# Patient Record
Sex: FEMALE | Race: BLACK OR AFRICAN AMERICAN | NOT HISPANIC OR LATINO
[De-identification: names, ages, dates, MRNs, and addresses within clinical notes are randomized per-mention and may not be internally consistent; named-entity substitution may affect disease eponyms.]

---

## 2020-10-12 ENCOUNTER — APPOINTMENT (OUTPATIENT)
Dept: CARDIOLOGY | Facility: CLINIC | Age: 65
End: 2020-10-12

## 2021-03-08 ENCOUNTER — RESULT REVIEW (OUTPATIENT)
Age: 66
End: 2021-03-08

## 2022-04-18 ENCOUNTER — APPOINTMENT (OUTPATIENT)
Dept: PULMONOLOGY | Facility: CLINIC | Age: 67
End: 2022-04-18
Payer: MEDICARE

## 2022-04-18 VITALS
WEIGHT: 180 LBS | SYSTOLIC BLOOD PRESSURE: 110 MMHG | OXYGEN SATURATION: 100 % | TEMPERATURE: 97.3 F | DIASTOLIC BLOOD PRESSURE: 66 MMHG | HEART RATE: 86 BPM | HEIGHT: 64 IN | BODY MASS INDEX: 30.73 KG/M2

## 2022-04-18 DIAGNOSIS — M80.80XD OTHER OSTEOPOROSIS WITH CURRENT PATHOLOGICAL FRACTURE, UNSPECIFIED SITE, SUBSEQUENT ENCOUNTER FOR FRACTURE WITH ROUTINE HEALING: ICD-10-CM

## 2022-04-18 DIAGNOSIS — M81.0 AGE-RELATED OSTEOPOROSIS W/OUT CURRENT PATHOLOGICAL FRACTURE: ICD-10-CM

## 2022-04-18 DIAGNOSIS — R91.8 OTHER NONSPECIFIC ABNORMAL FINDING OF LUNG FIELD: ICD-10-CM

## 2022-04-18 DIAGNOSIS — M19.90 UNSPECIFIED OSTEOARTHRITIS, UNSPECIFIED SITE: ICD-10-CM

## 2022-04-18 PROCEDURE — 99204 OFFICE O/P NEW MOD 45 MIN: CPT

## 2022-04-18 RX ORDER — ALENDRONATE SODIUM 70 MG/1
70 TABLET ORAL
Qty: 16 | Refills: 1 | Status: ACTIVE | COMMUNITY
Start: 2022-04-18

## 2022-04-18 RX ORDER — EMTRICITABINE AND TENOFOVIR ALAFENAMIDE 200; 25 MG/1; MG/1
200-25 TABLET ORAL DAILY
Qty: 1 | Refills: 0 | Status: ACTIVE | COMMUNITY
Start: 2022-04-18

## 2022-04-18 RX ORDER — FAMOTIDINE 20 MG/1
20 TABLET, FILM COATED ORAL DAILY
Qty: 90 | Refills: 1 | Status: ACTIVE | COMMUNITY
Start: 2022-04-18

## 2022-04-18 RX ORDER — RALTEGRAVIR 400 MG/1
400 TABLET, FILM COATED ORAL TWICE DAILY
Refills: 0 | Status: ACTIVE | COMMUNITY
Start: 2022-04-18

## 2022-04-18 RX ORDER — GABAPENTIN 300 MG/1
300 CAPSULE ORAL
Refills: 0 | Status: ACTIVE | COMMUNITY
Start: 2022-03-09

## 2022-04-18 RX ORDER — IBUPROFEN 600 MG/1
600 TABLET, FILM COATED ORAL
Refills: 0 | Status: ACTIVE | COMMUNITY
Start: 2022-04-18

## 2022-04-18 RX ORDER — ALBUTEROL SULFATE 90 UG/1
108 (90 BASE) INHALANT RESPIRATORY (INHALATION) 4 TIMES DAILY
Qty: 1 | Refills: 0 | Status: ACTIVE | COMMUNITY
Start: 2022-04-18

## 2022-04-18 NOTE — HISTORY OF PRESENT ILLNESS
[TextBox_4] : 66 former smoker (15 py, quit 30 ya) with HIV on ART (reports undetectable viral load), remote history of endocarditis s/p tricuspid valve repair, severe TR, mild CAD, HTN referred for abnormal chest CT ([facility]) from 4/7/22 which showed an 8 mm RUL nodule and mild peripheral ground glass. Pt denies dyspnea at rest or on exertion, no cough, sputum, fever, chest pain, orthopnea. She has good appetite and no unintentional weight loss. Pt has never seen a pulmonologist and says she does not use any inhalers though she has a prescription for albuterol, which her PMD reportedly prescribed at one point of shortness of breath and concern for COPD given her smoking history. PFT today is normal. Denies COVID in the past, but said she had pneumonia in the 1980s, denies history of TB or PCP.

## 2022-04-18 NOTE — PHYSICAL EXAM
[No Acute Distress] : no acute distress [Well Nourished] : well nourished [Normal Oropharynx] : normal oropharynx [Normal Appearance] : normal appearance [No JVD] : no jvd [Normal Rate/Rhythm] : normal rate/rhythm [Normal S1, S2] : normal s1, s2 [No Murmurs] : no murmurs [No Acc Muscle Use] : no acc muscle use [Normal Rhythm and Effort] : normal rhythm and effort [Clear to Auscultation Bilaterally] : clear to auscultation bilaterally [No Abnormalities] : no abnormalities [Benign] : benign [Not Tender] : not tender [Soft] : soft [Normal Gait] : normal gait [No Clubbing] : no clubbing [No Cyanosis] : no cyanosis [No Edema] : no edema [Normal Color/ Pigmentation] : normal color/ pigmentation [No Focal Deficits] : no focal deficits [Oriented x3] : oriented x3 [Normal Affect] : normal affect [Normal Mood] : normal mood

## 2022-04-18 NOTE — ASSESSMENT
[FreeTextEntry1] : 66 former smoker (15 py, quit 30 ya) with HIV on ART (reports undetectable viral load), remote history of endocarditis s/p tricuspid valve repair, severe TR, mild CAD, HTN referred for abnormal chest CT ([facility]) from 4/7/22 which showed an 8 mm RUL nodule and mild peripheral ground glass. She is currently asymptomatic, no hypoxemia on exertion. We reviewed the images from pt's previous scan at [facility] and instructed pt to schedule a repeat CT chest in 6 months at [facility]. In the meantime, we told her to obtain the disk from her previous scan at [facility] and drop it off at our office so that it can be used by the radiologist for comparison. \par \par Al Garcia MD\par Pulmonary Fellow\par \par Discussed with Dr. Rosario\par \par

## 2022-04-28 ENCOUNTER — NON-APPOINTMENT (OUTPATIENT)
Age: 67
End: 2022-04-28

## 2022-04-28 ENCOUNTER — APPOINTMENT (OUTPATIENT)
Dept: CARDIOLOGY | Facility: CLINIC | Age: 67
End: 2022-04-28
Payer: MEDICARE

## 2022-04-28 VITALS
BODY MASS INDEX: 31.41 KG/M2 | TEMPERATURE: 97.1 F | WEIGHT: 184 LBS | OXYGEN SATURATION: 98 % | HEART RATE: 71 BPM | HEIGHT: 64 IN

## 2022-04-28 VITALS — SYSTOLIC BLOOD PRESSURE: 124 MMHG | DIASTOLIC BLOOD PRESSURE: 80 MMHG

## 2022-04-28 VITALS — DIASTOLIC BLOOD PRESSURE: 86 MMHG | SYSTOLIC BLOOD PRESSURE: 140 MMHG

## 2022-04-28 DIAGNOSIS — Z83.3 FAMILY HISTORY OF DIABETES MELLITUS: ICD-10-CM

## 2022-04-28 DIAGNOSIS — R06.02 SHORTNESS OF BREATH: ICD-10-CM

## 2022-04-28 DIAGNOSIS — Z87.891 PERSONAL HISTORY OF NICOTINE DEPENDENCE: ICD-10-CM

## 2022-04-28 DIAGNOSIS — Z82.49 FAMILY HISTORY OF ISCHEMIC HEART DISEASE AND OTHER DISEASES OF THE CIRCULATORY SYSTEM: ICD-10-CM

## 2022-04-28 DIAGNOSIS — G62.9 POLYNEUROPATHY, UNSPECIFIED: ICD-10-CM

## 2022-04-28 DIAGNOSIS — Z87.898 PERSONAL HISTORY OF OTHER SPECIFIED CONDITIONS: ICD-10-CM

## 2022-04-28 PROCEDURE — 99205 OFFICE O/P NEW HI 60 MIN: CPT

## 2022-04-28 PROCEDURE — 93000 ELECTROCARDIOGRAM COMPLETE: CPT

## 2022-04-29 NOTE — PHYSICAL EXAM
[No Acute Distress] : no acute distress [Obese] : obese [Normal Conjunctiva] : normal conjunctiva [Normal Venous Pressure] : normal venous pressure [No Carotid Bruit] : no carotid bruit 1 [Normal S1, S2] : normal S1, S2 [No Rub] : no rub [No Gallop] : no gallop [Murmur] : murmur [Clear Lung Fields] : clear lung fields [Good Air Entry] : good air entry [No Respiratory Distress] : no respiratory distress  [Soft] : abdomen soft [Non Tender] : non-tender [No Masses/organomegaly] : no masses/organomegaly [Normal Bowel Sounds] : normal bowel sounds [Normal Gait] : normal gait [No Edema] : no edema [No Cyanosis] : no cyanosis [No Clubbing] : no clubbing [No Varicosities] : no varicosities [No Rash] : no rash [No Skin Lesions] : no skin lesions [Moves all extremities] : moves all extremities [No Focal Deficits] : no focal deficits [Normal Speech] : normal speech [Alert and Oriented] : alert and oriented [Normal memory] : normal memory [de-identified] : 1/6 YEN aortic area.

## 2022-04-29 NOTE — CARDIOLOGY SUMMARY
[de-identified] : Sinus rhythm with frequent APCs.  Some were aberrantly conducted.  There is also left axis deviation. ?blocked APC

## 2022-04-29 NOTE — DISCUSSION/SUMMARY
[FreeTextEntry1] : I recommended that she go back on hydrochlorothiazide and to follow low-salt diet for consistent control of her blood pressure.  I also strongly advised her to try and lose weight which would help in many ways.  I gave her the telephone number for /valve clinic, to set up an appointment for further follow-up.  In the meantime I asked her to see if she can get me the report of the Holter monitor.

## 2022-04-29 NOTE — HISTORY OF PRESENT ILLNESS
[FreeTextEntry1] : 66-year-old lady was referred by Dr. Lulú Jonas.  She had first seen Dr. Salcedo because of irregular heartbeat about 2 months ago. Dr. Jonas wanted her to see DR Davis from the structural heart team from  [facility] because of tricuspid regurgitation.  Her PCP is Dr. John Gilbert from [facility].  She was not sure as to why the appointment was made for her to see me.  She felt that it was Dr. Jonas's office who referred her to see me.\par \par Apparently, a 24-hour Holter monitor was performed.  She does not know the results.  Echocardiogram was also done as part of evaluation.  Left ventricular size was normal.  Apparently there was mild concentric left ventricular hypertrophy with normal global wall motion.  Calculated EF was 74%.  Right atrial cavity was mild to moderately dilated.  Mitral valve ws structurally normal with mild mitral regurgitation.  Tricuspid valve showed severe eccentric regurgitation.  There was also moderate pulmonary hypertension and a question of leaflet prolapse from lack of coaptation as suspected mechanism.  IVC was normal.  A CT scan of the chest was also done.  It showed bilateral airspace opacities which were multifocal and located peripherally including areas of ground glass appearance..  She does not have any history of COVID-19 infection.  There was also bilateral apical scarring.  Multiple pulmonary nodules were also seen.  A repeat chest CT was recommended to be done in 6 months.  She was also noted to have calcified gallstones, contracted left kidney and a nodular appearance to the liver.  \par \par In 1976, owing to IV drug abuse, she had endocarditis involving 2 valves.  He was treated with antibiotics.  She stopped using drugs about 30 years ago.\par \par She denies any exertional chest pain palpitation or dizziness.  She stated that she does not walk much.  After walking about a block or so she has to stop and take "a deep breath" to feel good.  About 2 months ago she had swelling of her legs.  Her blood pressure medications were changed and she was given a diuretic.  However since she does not have swelling now, she stopped the diuretic.  She has not taken it for a month.  She feels skipping of heartbeat.  At times she thinks there may be some racing.\par \par In 2020, because of shortness of breath she had a stress test which was felt to be abnormal and subsequently she had cardiac catheterization done.  Left main had minimal luminal irregularities.  LAD had moderate diffuse disease throughout the vessel.  Mid LAD also had a 25% lesion.  Circumflex showed mild diffuse disease throughout the vessel.  Similar findings were noted throughout the right coronary artery.  Left ventricular ejection fraction was estimated to be 55%.  Medical treatment was recommended.\par \par She had PFTs done by . They were normal.

## 2022-04-29 NOTE — ASSESSMENT
[FreeTextEntry1] : Her initial blood pressure was high.  Subsequent blood pressure reading was lower and in  the normal range.  She does not follow a low-salt diet.  I do not have the results of the Holter monitor.\par \par She seems to have fairly significant tricuspid regurgitation.  It would be a good idea for her to see Dr. Davis from the structural heart team for further evaluation. NELLY would be helpful.

## 2022-05-23 ENCOUNTER — APPOINTMENT (OUTPATIENT)
Dept: CARDIOTHORACIC SURGERY | Facility: CLINIC | Age: 67
End: 2022-05-23
Payer: MEDICARE

## 2022-05-23 VITALS
HEIGHT: 64 IN | BODY MASS INDEX: 31.58 KG/M2 | SYSTOLIC BLOOD PRESSURE: 102 MMHG | HEART RATE: 72 BPM | WEIGHT: 185 LBS | TEMPERATURE: 97.6 F | DIASTOLIC BLOOD PRESSURE: 71 MMHG | OXYGEN SATURATION: 100 % | RESPIRATION RATE: 18 BRPM

## 2022-05-23 PROCEDURE — 99204 OFFICE O/P NEW MOD 45 MIN: CPT

## 2022-05-23 RX ORDER — LYSINE HCL 500 MG
TABLET ORAL TWICE DAILY
Refills: 0 | Status: ACTIVE | COMMUNITY

## 2022-05-23 RX ORDER — ASPIRIN 81 MG/1
81 TABLET, CHEWABLE ORAL
Refills: 0 | Status: DISCONTINUED | COMMUNITY
Start: 2022-04-18 | End: 2022-05-23

## 2022-06-03 NOTE — REVIEW OF SYSTEMS
[Fever] : no fever [Chills] : no chills [Chest Discomfort] : no chest discomfort [Palpitations] : no palpitations [Orthopnea] : no orthopnea [PND] : no PND [Abdominal Pain] : no abdominal pain [FreeTextEntry4] : no active dental concerns [FreeTextEntry7] : appetite decreased  [de-identified] : neuropathy

## 2022-06-03 NOTE — PHYSICAL EXAM
[Right Carotid Bruit] : no bruit heard over the right carotid [Left Carotid Bruit] : no bruit heard over the left carotid

## 2022-06-03 NOTE — DISCUSSION/SUMMARY
[FreeTextEntry1] : Ms Cox is referred for evaluation of TR; it is of unclear etiology, and may be a sequela of a remote IE. She will need a repeat TTE for further assessment and possibly a NELLY. I discussed the upcoming TRISCEND II study (TTVR), and she would like to be considered. She will return, with a TTE, once we are able to enroll patients. No changes made to her medications as she appears compensated today in the office. I advised that she continue the Lasix once daily as she has been taking it (not TIW, as was prescribed).

## 2022-06-03 NOTE — HISTORY OF PRESENT ILLNESS
[FreeTextEntry1] : Ms. Cox is a 66 year old female referred by Carlos Alberto Jonas and Jeremias for evaluation of tricuspid regurgitation. She was seen by Dr. Jonas for cardiac clearance prior to planned right shoulder repair. Her past medical history includes endocarditis in 1976 in the setting of IVD. A TTE with Dr. Jonas demonstrated severe tricuspid regurgitation with evidence of a prior tricuspid valve repair. She reports several weeks of increasing exertional dyspnea and fatigue with functional decline. Her appetite is diminished. She has no PND, orthopnea, dizziness or edema.  She had significant ankle and pedal edema that improved after starting HCTZ six months ago. \par \par Past medical history includes HIV (diagnosed in 1989) on HAART, IVDA (quit 30 years ago and attends Narcotic Anonymous regularly), HTN, arthritis, Hepatitis C (treated 10 years ago), and neuropathy. She had an abnormal ST in 2020 and angiography at [facility] demonstrated mild, nonobstructive disease. \par \par She has a remote history of IE but reports no prior cardiac surgery; she was treated with an extended course of antibiotics at that time (it was during her pregnancy in 1976). She recently lost her 45 year old daughter to COVID (she had underlying sarcoid). She is now involved in the care of her 3 grandchildren. She has a sensation of needing "to take a real deep breath to keep the right breathing flow" over the prior 3 months--not specifically with activity. She admits to being quite sedentary and only really walks from her apartment to her car and a short FOS in her apartment. She does feel "a lot of fatigue lately" and often asks herself "why am I so weak lately". She also feels "hot and cold" in her lower extremities. She notes "every now and then I have to take a deep breath" at times. She has a bottle of Lasix that was prescribed 20mg on Monday, Wednesday, and Friday--but notes she did not realize that was the case and has been taking it daily for the past 2 months. It was started at that time for LE edema that resolved. Her appetite is fair, she eats "like once a day".

## 2022-07-11 ENCOUNTER — APPOINTMENT (OUTPATIENT)
Dept: CARDIOTHORACIC SURGERY | Facility: CLINIC | Age: 67
End: 2022-07-11

## 2022-08-16 ENCOUNTER — NON-APPOINTMENT (OUTPATIENT)
Age: 67
End: 2022-08-16

## 2022-08-16 ENCOUNTER — APPOINTMENT (OUTPATIENT)
Dept: CARDIOLOGY | Facility: CLINIC | Age: 67
End: 2022-08-16
Payer: MEDICARE

## 2022-08-16 VITALS
BODY MASS INDEX: 31.24 KG/M2 | WEIGHT: 183 LBS | HEIGHT: 64 IN | DIASTOLIC BLOOD PRESSURE: 52 MMHG | TEMPERATURE: 97.2 F | OXYGEN SATURATION: 96 % | SYSTOLIC BLOOD PRESSURE: 92 MMHG | HEART RATE: 94 BPM

## 2022-08-16 DIAGNOSIS — I07.1 RHEUMATIC TRICUSPID INSUFFICIENCY: ICD-10-CM

## 2022-08-16 PROCEDURE — 99215 OFFICE O/P EST HI 40 MIN: CPT | Mod: 25

## 2022-08-16 PROCEDURE — 93000 ELECTROCARDIOGRAM COMPLETE: CPT

## 2022-08-16 RX ORDER — LOSARTAN POTASSIUM 100 MG/1
100 TABLET, FILM COATED ORAL
Refills: 0 | Status: DISCONTINUED | COMMUNITY
Start: 2022-04-18 | End: 2022-08-16

## 2022-08-16 RX ORDER — HYDROCHLOROTHIAZIDE 12.5 MG/1
12.5 TABLET ORAL DAILY
Qty: 90 | Refills: 1 | Status: DISCONTINUED | COMMUNITY
Start: 2022-04-28 | End: 2022-08-16

## 2022-08-16 NOTE — HISTORY OF PRESENT ILLNESS
[FreeTextEntry1] : HTN HIV [de-identified] : MARINA DIXON 66 year F presents with no dyspnea, (NYHA 0 ); no chest pain (CCS 0 ); \par No palpitations; No syncope/presyncope; no claudication No peripheral edema.\par No    Fam Hx CAD           remote Tobacco         NO  DM         No  HLD          Yes HTN\par 03/22       Echo   CLVHi    LEE              LVEF 74\par 04/22      EKG        NSR    PAC       82 BPM               NO delta   NO pathologic Q's    Normal QTc\par 11/2020  Cath  Non obstructive    ([facility])   LVEF 55%              No PCI                No CABG\par Remote IE TV 1976 now TR treated with losartan lasix HCTZ but borderline low SBP tired and exertional intolerance. Repeat echo. Reduce losartan to 25mg/d and stop HCTZ.. Continue lasix 20mg. RTC 6w.\par BMI 32 HIV positive Remote IVDA

## 2022-08-25 ENCOUNTER — APPOINTMENT (OUTPATIENT)
Dept: CARDIOLOGY | Facility: CLINIC | Age: 67
End: 2022-08-25

## 2022-08-29 ENCOUNTER — APPOINTMENT (OUTPATIENT)
Dept: CARDIOLOGY | Facility: CLINIC | Age: 67
End: 2022-08-29
Payer: MEDICARE

## 2022-08-29 PROCEDURE — 93306 TTE W/DOPPLER COMPLETE: CPT

## 2022-10-17 ENCOUNTER — APPOINTMENT (OUTPATIENT)
Dept: PULMONOLOGY | Facility: CLINIC | Age: 67
End: 2022-10-17

## 2022-10-20 ENCOUNTER — APPOINTMENT (OUTPATIENT)
Dept: CARDIOLOGY | Facility: CLINIC | Age: 67
End: 2022-10-20
Payer: MEDICARE

## 2022-10-20 VITALS
WEIGHT: 180 LBS | DIASTOLIC BLOOD PRESSURE: 75 MMHG | SYSTOLIC BLOOD PRESSURE: 117 MMHG | HEIGHT: 64 IN | BODY MASS INDEX: 30.73 KG/M2 | OXYGEN SATURATION: 97 % | TEMPERATURE: 98 F | HEART RATE: 74 BPM

## 2022-10-20 DIAGNOSIS — I49.1 ATRIAL PREMATURE DEPOLARIZATION: ICD-10-CM

## 2022-10-20 PROCEDURE — 99215 OFFICE O/P EST HI 40 MIN: CPT | Mod: 25

## 2022-10-20 PROCEDURE — 93000 ELECTROCARDIOGRAM COMPLETE: CPT

## 2022-10-20 NOTE — REASON FOR VISIT
[FreeTextEntry1] : Total visit time 45min.\par MARINA DIXON 67 year F presents with HTN HLD and reports no dyspnea, chest pain, palpitations, syncope, claudication mild peripheral edema.   Tobacco Negative/Reformed.BMI 31. 8/22 EKG sinus tachy 103/min today HR 74/min. Echo 8/22 severe TR (phx infective endocarditis) 11/20 Cath nonobstructive CAD (LAD 25%). No new interval complaints. BP improved and andrew new RVF symptoms. COntinue lasix losartan RTC 6m\par No Fam Hx CAD; remote Tobacco; NO  DM; No  HLD; Yes HTN\par 03/22       Echo   CLVHi    LEE    TR severe LVEF 74\par 04/22      EKG        NSR    PAC       82 BPM               NO delta   NO pathologic Q's    Normal QTc\par 11/2020  Cath  Non obstructive    ([facility])   LVEF 55%              No PCI                No CABG\par Remote IE TV 1976 now TR treated with losartan lasix. BMI 32 HIV positive Remote IVDA\par \par

## 2023-02-13 ENCOUNTER — APPOINTMENT (OUTPATIENT)
Dept: CARDIOLOGY | Facility: CLINIC | Age: 68
End: 2023-02-13
Payer: MEDICARE

## 2023-02-13 ENCOUNTER — NON-APPOINTMENT (OUTPATIENT)
Age: 68
End: 2023-02-13

## 2023-02-13 VITALS
BODY MASS INDEX: 30.22 KG/M2 | TEMPERATURE: 98 F | SYSTOLIC BLOOD PRESSURE: 137 MMHG | DIASTOLIC BLOOD PRESSURE: 84 MMHG | OXYGEN SATURATION: 98 % | HEART RATE: 68 BPM | HEIGHT: 64 IN | WEIGHT: 177 LBS

## 2023-02-13 DIAGNOSIS — Z86.19 PERSONAL HISTORY OF OTHER INFECTIOUS AND PARASITIC DISEASES: ICD-10-CM

## 2023-02-13 DIAGNOSIS — Z86.79 PERSONAL HISTORY OF OTHER DISEASES OF THE CIRCULATORY SYSTEM: ICD-10-CM

## 2023-02-13 PROCEDURE — 99215 OFFICE O/P EST HI 40 MIN: CPT | Mod: 25

## 2023-02-13 PROCEDURE — 93000 ELECTROCARDIOGRAM COMPLETE: CPT

## 2023-02-13 NOTE — PHYSICAL EXAM
[Normal Conjunctiva] : normal conjunctiva [Normal Venous Pressure] : normal venous pressure [No Carotid Bruit] : no carotid bruit [No Rub] : no rub [No Gallop] : no gallop [Clear Lung Fields] : clear lung fields [Good Air Entry] : good air entry [No Masses/organomegaly] : no masses/organomegaly [No Cyanosis] : no cyanosis [No Clubbing] : no clubbing [No Skin Lesions] : no skin lesions [Moves all extremities] : moves all extremities [Normal Speech] : normal speech [Alert and Oriented] : alert and oriented [Normal memory] : normal memory [No Acute Distress] : no acute distress [Well Nourished] : well nourished [Well Developed] : well developed [Well-Appearing] : well-appearing [Normal Sclera/Conjunctiva] : normal sclera/conjunctiva [PERRL] : pupils equal round and reactive to light [EOMI] : extraocular movements intact [Normal Outer Ear/Nose] : the outer ears and nose were normal in appearance [Normal Oropharynx] : the oropharynx was normal [No JVD] : no jugular venous distention [No Lymphadenopathy] : no lymphadenopathy [Supple] : supple [Thyroid Normal, No Nodules] : the thyroid was normal and there were no nodules present [No Respiratory Distress] : no respiratory distress  [No Accessory Muscle Use] : no accessory muscle use [Clear to Auscultation] : lungs were clear to auscultation bilaterally [Normal Rate] : normal rate  [Regular Rhythm] : with a regular rhythm [Normal S1, S2] : normal S1 and S2 [No Murmur] : no murmur heard [No Carotid Bruits] : no carotid bruits [No Abdominal Bruit] : a ~M bruit was not heard ~T in the abdomen [No Varicosities] : no varicosities [Pedal Pulses Present] : the pedal pulses are present [No Edema] : there was no peripheral edema [No Palpable Aorta] : no palpable aorta [No Extremity Clubbing/Cyanosis] : no extremity clubbing/cyanosis [Soft] : abdomen soft [Non Tender] : non-tender [Non-distended] : non-distended [No Masses] : no abdominal mass palpated [No HSM] : no HSM [Normal Bowel Sounds] : normal bowel sounds [Normal Posterior Cervical Nodes] : no posterior cervical lymphadenopathy [Normal Anterior Cervical Nodes] : no anterior cervical lymphadenopathy [No CVA Tenderness] : no CVA  tenderness [No Spinal Tenderness] : no spinal tenderness [No Joint Swelling] : no joint swelling [Grossly Normal Strength/Tone] : grossly normal strength/tone [No Rash] : no rash [Coordination Grossly Intact] : coordination grossly intact [No Focal Deficits] : no focal deficits [Normal Gait] : normal gait [Deep Tendon Reflexes (DTR)] : deep tendon reflexes were 2+ and symmetric [Normal Affect] : the affect was normal [Normal Insight/Judgement] : insight and judgment were intact

## 2023-02-13 NOTE — HISTORY OF PRESENT ILLNESS
[FreeTextEntry1] : HTN HIV [de-identified] : MARINA DIXON 66 year F presents with No interval complaints. Tolerating medications well. Total visit time 45min.\par 02/23      EKG:NSR  77 BPM; AMI AU PPRWP; 11/2020  Cath  SARAH    ([facility])   LVEF 55%  \par Remote IE TV 1976 now TR treated with losartan lasix. Losartan  25mg/d.. Continue lasix 20mg. RTC 6w.\par BMI 32 HIV positive Remote IVDA. 8/22 Echo EF 55%. C/o leg coolness ANAHI requested. RTC 3m. SBP improved.

## 2023-03-30 ENCOUNTER — APPOINTMENT (OUTPATIENT)
Dept: ULTRASOUND IMAGING | Facility: HOSPITAL | Age: 68
End: 2023-03-30

## 2023-04-20 ENCOUNTER — APPOINTMENT (OUTPATIENT)
Dept: CARDIOLOGY | Facility: CLINIC | Age: 68
End: 2023-04-20
Payer: MEDICARE

## 2023-04-20 VITALS
DIASTOLIC BLOOD PRESSURE: 85 MMHG | WEIGHT: 180 LBS | OXYGEN SATURATION: 97 % | SYSTOLIC BLOOD PRESSURE: 134 MMHG | TEMPERATURE: 97.2 F | HEIGHT: 64 IN | BODY MASS INDEX: 30.73 KG/M2 | HEART RATE: 65 BPM

## 2023-04-20 DIAGNOSIS — I10 ESSENTIAL (PRIMARY) HYPERTENSION: ICD-10-CM

## 2023-04-20 DIAGNOSIS — F19.11 OTHER PSYCHOACTIVE SUBSTANCE ABUSE, IN REMISSION: ICD-10-CM

## 2023-04-20 DIAGNOSIS — B20 HUMAN IMMUNODEFICIENCY VIRUS [HIV] DISEASE: ICD-10-CM

## 2023-04-20 PROCEDURE — 99214 OFFICE O/P EST MOD 30 MIN: CPT

## 2023-04-20 RX ORDER — LOSARTAN POTASSIUM 25 MG/1
25 TABLET, FILM COATED ORAL
Qty: 90 | Refills: 3 | Status: ACTIVE | COMMUNITY
Start: 2022-08-16 | End: 1900-01-01

## 2023-04-20 RX ORDER — FUROSEMIDE 20 MG/1
20 TABLET ORAL
Qty: 30 | Refills: 6 | Status: ACTIVE | COMMUNITY
Start: 2022-05-23 | End: 1900-01-01

## 2023-04-20 NOTE — HISTORY OF PRESENT ILLNESS
[FreeTextEntry1] : HTN HIV [de-identified] : MARINA DIXON 66 year F presents with No interval complaints. Tolerating medications well. Total visit time 35min.\par 02/23      EKG:NSR  77 BPM; AMI AU PPRWP; 11/2020  Cath  NOCROGER    ([facility])   LVEF 55%  \par Remote IE TV 1976 now TR. Continue Losartan  25mg/d  lasix 20mg. RTC 6w. BMI 32 HIV positive Remote IVDA. 8/22 Echo EF 55%.  ANAHI done elsewhere reportedly negative. RTC 3m. SBP improved.

## 2023-05-15 ENCOUNTER — APPOINTMENT (OUTPATIENT)
Dept: CARDIOLOGY | Facility: CLINIC | Age: 68
End: 2023-05-15

## 2023-07-20 ENCOUNTER — APPOINTMENT (OUTPATIENT)
Dept: CARDIOLOGY | Facility: CLINIC | Age: 68
End: 2023-07-20

## 2024-04-09 ENCOUNTER — APPOINTMENT (OUTPATIENT)
Dept: CARDIOLOGY | Facility: CLINIC | Age: 69
End: 2024-04-09